# Patient Record
Sex: FEMALE | Race: BLACK OR AFRICAN AMERICAN | Employment: UNEMPLOYED | URBAN - METROPOLITAN AREA
[De-identification: names, ages, dates, MRNs, and addresses within clinical notes are randomized per-mention and may not be internally consistent; named-entity substitution may affect disease eponyms.]

---

## 2017-01-04 RX ORDER — LEVONORGESTREL AND ETHINYL ESTRADIOL 0.1-0.02MG
KIT ORAL
Qty: 28 TAB | Refills: 0 | Status: SHIPPED | OUTPATIENT
Start: 2017-01-04 | End: 2018-01-02 | Stop reason: ALTCHOICE

## 2018-01-02 ENCOUNTER — OFFICE VISIT (OUTPATIENT)
Dept: FAMILY MEDICINE CLINIC | Age: 23
End: 2018-01-02

## 2018-01-02 VITALS
BODY MASS INDEX: 23.18 KG/M2 | TEMPERATURE: 97.6 F | DIASTOLIC BLOOD PRESSURE: 80 MMHG | OXYGEN SATURATION: 100 % | HEART RATE: 88 BPM | WEIGHT: 171.13 LBS | HEIGHT: 72 IN | SYSTOLIC BLOOD PRESSURE: 100 MMHG | RESPIRATION RATE: 16 BRPM

## 2018-01-02 DIAGNOSIS — F32.A ANXIETY AND DEPRESSION: ICD-10-CM

## 2018-01-02 DIAGNOSIS — F41.9 ANXIETY AND DEPRESSION: ICD-10-CM

## 2018-01-02 DIAGNOSIS — N92.0 MENORRHAGIA WITH REGULAR CYCLE: Primary | ICD-10-CM

## 2018-01-02 RX ORDER — ETONOGESTREL AND ETHINYL ESTRADIOL 11.7; 2.7 MG/1; MG/1
1 INSERT, EXTENDED RELEASE VAGINAL
Qty: 3 DEVICE | Refills: 3 | Status: SHIPPED | OUTPATIENT
Start: 2018-01-02

## 2018-01-02 RX ORDER — ETONOGESTREL AND ETHINYL ESTRADIOL 11.7; 2.7 MG/1; MG/1
INSERT, EXTENDED RELEASE VAGINAL
COMMUNITY
End: 2018-01-02 | Stop reason: SDUPTHER

## 2018-01-02 RX ORDER — ESCITALOPRAM OXALATE 10 MG/1
10 TABLET ORAL DAILY
Qty: 30 TAB | Refills: 5 | Status: SHIPPED | OUTPATIENT
Start: 2018-01-02 | End: 2018-01-30 | Stop reason: ALTCHOICE

## 2018-01-02 NOTE — PROGRESS NOTES
1. Have you been to the ER, urgent care clinic since your last visit? Hospitalized since your last visit? No    2. Have you seen or consulted any other health care providers outside of the [address] [place] [address] since your last visit? Include any pap smears or colon screening. No    Chief Complaint   Patient presents with    Other     discuss BC     Pt states she got nuvaring from Health Dept x 6 mo ago.

## 2018-01-02 NOTE — MR AVS SNAPSHOT
Visit Information Date & Time Provider Department Dept. Phone Encounter #  
 1/2/2018 11:00 AM Kaylayani Cadetashia, YFN [facility] [phone] [id number] Upcoming Health Maintenance Date Due Hepatitis A Peds Age 1-18 (1 of 2 - Standard Series) 6/12/1998 DTaP/Tdap/Td series (1 - Tdap) 6/12/2004 HPV AGE 9Y-26Y (1 of 3 - Female 3 Dose Series) 6/12/2008 Influenza Age 5 to Adult 8/1/2017 Allergies as of 1/2/2018  Review Complete On: 1/2/2018 By: Minor Minor LPN Severity Noted Reaction Type Reactions Aspirin  10/08/2013    Hives Current Immunizations  Never Reviewed No immunizations on file. Not reviewed this visit You Were Diagnosed With   
  
 Codes Comments Menorrhagia with regular cycle    -  Primary ICD-10-CM: N92.0 ICD-9-CM: 626.2 Anxiety and depression     ICD-10-CM: F41.8 ICD-9-CM: 300.00, 311 Vitals BP Pulse Temp Resp Height(growth percentile) Weight(growth percentile) 100/80 88 97.6 °F (36.4 °C) (Oral) 16 6' (1.829 m) 171 lb 2 oz (77.6 kg) SpO2 BMI OB Status Smoking Status 100% 23.21 kg/m2 Chemically Induced Never Smoker Vitals History BMI and BSA Data Body Mass Index Body Surface Area  
 23.21 kg/m 2 1.99 m 2 Preferred Pharmacy Pharmacy Name Phone [facility] [address] [place] [address], [address] [place] [address] [phone] Your Updated Medication List  
  
   
This list is accurate as of: 1/2/18 11:19 AM.  Always use your most recent med list.  
  
  
  
  
 albuterol 90 mcg/actuation inhaler Commonly known as:  PROAIR HFA Take 1 Puff by inhalation every four (4) hours as needed for Wheezing or Shortness of Breath.  
  
 escitalopram oxalate 10 mg tablet Commonly known as:  Alexander Mandeville Take 1 Tab by mouth daily. ethinyl estradiol-etonogestrel 0.12-0.015 mg/24 hr vaginal ring Commonly known as:  Marco Chirinos  
 1 Each by Intravaginal route every twenty-eight (28) days. loratadine 10 mg tablet Commonly known as:  Jim Wall Take 1 Tab by mouth daily. Prescriptions Sent to Pharmacy Refills  
 ethinyl estradiol-etonogestrel (NUVARING) 0.12-0.015 mg/24 hr vaginal ring 3 Si Each by Intravaginal route every twenty-eight (28) days. Class: Normal  
 Pharmacy: [facility] [address] [place], [place] [address]y [address] [place] [address] E Ph #: [phone] Route: Intravaginal  
 escitalopram oxalate (LEXAPRO) 10 mg tablet 5 Sig: Take 1 Tab by mouth daily. Class: Normal  
 Pharmacy: [facility] [address] [place], [place] [address]y [address] [place] [address] E Ph #: [phone] Route: Oral  
  
Introducing [facility] & HEALTH SERVICES! Chelsey Short introduces Servhawk patient portal. Now you can access parts of your medical record, email your doctor's office, and request medication refills online. 1. In your internet browser, go to [email or web address] 2. Click on the First Time User? Click Here link in the Sign In box. You will see the New Member Sign Up page. 3. Enter your Servhawk Access Code exactly as it appears below. You will not need to use this code after youve completed the sign-up process. If you do not sign up before the expiration date, you must request a new code. · Servhawk Access Code: [email or web address] Expires: 2018 11:19 AM 
 
4. Enter the last four digits of your Social Security Number (xxxx) and Date of Birth (mm/dd/yyyy) as indicated and click Submit. You will be taken to the next sign-up page. 5. Create a [email or web address] ID. This will be your Servhawk login ID and cannot be changed, so think of one that is secure and easy to remember. 6. Create a Servhawk password. You can change your password at any time. 7. Enter your Password Reset Question and Answer.  This can be used at a later time if you forget your password. 8. Enter your e-mail address. You will receive e-mail notification when new information is available in [address] 9. Click Sign Up. You can now view and download portions of your medical record. 10. Click the Download Summary menu link to download a portable copy of your medical information. If you have questions, please visit the Frequently Asked Questions section of the [email or web address] website. Remember, [email or web address] is NOT to be used for urgent needs. For medical emergencies, dial 911. Now available from your iPhone and Android! Please provide this summary of care documentation to your next provider. Your primary care clinician is listed as Se Zafar. If you have any questions after today's visit, please call [phone].

## 2018-01-03 NOTE — PROGRESS NOTES
HISTORY OF PRESENT ILLNESS  Sandeep Gentile is a 23 y.o. female. HPI  She is here for refill of her nuvaring, first started by health dept  Without the ring she has very heavy painful cycles    Would also like to restart her med for anxiety and depression  zoloft last year but did not feel it helped    ROS  A comprehensive review of system was obtained and negative except findings in the HPI    Visit Vitals    /80    Pulse 88    Temp 97.6 °F (36.4 °C) (Oral)    Resp 16    Ht 6' (1.829 m)    Wt 171 lb 2 oz (77.6 kg)    SpO2 100%    BMI 23.21 kg/m2     Physical Exam   Constitutional: She is oriented to person, place, and time. She appears well-developed and well-nourished. Neck: No JVD present. Cardiovascular: Normal rate, regular rhythm and intact distal pulses. Exam reveals no gallop and no friction rub. No murmur heard. Pulmonary/Chest: Effort normal and breath sounds normal. No respiratory distress. She has no wheezes. Musculoskeletal: She exhibits no edema. Neurological: She is alert and oriented to person, place, and time. Skin: Skin is warm. Nursing note and vitals reviewed. ASSESSMENT and PLAN  Encounter Diagnoses   Name Primary?  Menorrhagia with regular cycle Yes    Anxiety and depression      Orders Placed This Encounter    ethinyl estradiol-etonogestrel (NUVARING) 0.12-0.015 mg/24 hr vaginal ring    escitalopram oxalate (LEXAPRO) 10 mg tablet     Refilled nuvaring, will set up pap for this summer after she turns 23  Start lexapro 10mg one a day  Reviewed adr/se of med and what to expect  Recheck 3 weeks    I have discussed the diagnosis with the patient and the intended plan as seen in the above orders. The patient has received an after-visit summary and questions were answered concerning future plans. Patient conveyed understanding of the plan at the time of the visit.     Abigail Ribeiro, MSN, ANP  1/2/2018

## 2018-01-12 ENCOUNTER — OFFICE VISIT (OUTPATIENT)
Dept: FAMILY MEDICINE CLINIC | Age: 23
End: 2018-01-12

## 2018-01-12 VITALS
SYSTOLIC BLOOD PRESSURE: 121 MMHG | RESPIRATION RATE: 18 BRPM | TEMPERATURE: 98.9 F | BODY MASS INDEX: 22.75 KG/M2 | HEIGHT: 72 IN | DIASTOLIC BLOOD PRESSURE: 80 MMHG | WEIGHT: 168 LBS | HEART RATE: 72 BPM

## 2018-01-12 DIAGNOSIS — R30.0 BURNING WITH URINATION: ICD-10-CM

## 2018-01-12 DIAGNOSIS — R35.0 URINARY FREQUENCY: ICD-10-CM

## 2018-01-12 DIAGNOSIS — N39.0 URINARY TRACT INFECTION WITHOUT HEMATURIA, SITE UNSPECIFIED: Primary | ICD-10-CM

## 2018-01-12 LAB
BILIRUB UR QL STRIP: NORMAL
GLUCOSE UR-MCNC: NEGATIVE MG/DL
KETONES P FAST UR STRIP-MCNC: NORMAL MG/DL
PH UR STRIP: 6.5 [PH] (ref 4.6–8)
PROT UR QL STRIP: NORMAL
SP GR UR STRIP: 1.02 (ref 1–1.03)
UA UROBILINOGEN AMB POC: NORMAL (ref 0.2–1)
URINALYSIS CLARITY POC: CLEAR
URINALYSIS COLOR POC: YELLOW
URINE BLOOD POC: NORMAL
URINE LEUKOCYTES POC: NORMAL
URINE NITRITES POC: NEGATIVE

## 2018-01-12 RX ORDER — SULFAMETHOXAZOLE AND TRIMETHOPRIM 800; 160 MG/1; MG/1
1 TABLET ORAL 2 TIMES DAILY
Qty: 10 TAB | Refills: 0 | Status: SHIPPED | OUTPATIENT
Start: 2018-01-12 | End: 2018-01-17

## 2018-01-12 RX ORDER — FLUCONAZOLE 150 MG/1
150 TABLET ORAL DAILY
Qty: 1 TAB | Refills: 1 | Status: SHIPPED | OUTPATIENT
Start: 2018-01-12 | End: 2018-01-13

## 2018-01-12 NOTE — PROGRESS NOTES
Chief Complaint   Patient presents with    Urinary Frequency    Urinary Burning     Patient in office today for possible uti sx that began 2 days ago. Have not treated with otc. 1. Have you been to the ER, urgent care clinic since your last visit? Hospitalized since your last visit? No    2. Have you seen or consulted any other health care providers outside of the [address] [place] [address] since your last visit? Include any pap smears or colon screening.  No

## 2018-01-12 NOTE — PROGRESS NOTES
Chief Complaint   Patient presents with    Urinary Frequency    Urinary Burning     Patient in office today for possible uti sx that began 2 days ago. Have not treated with otc. Pt reports burning with urination. Denies any vaginal discharge. Currently on OCP. Denies any recent abx. Denies any fever, flank or kidney pain. Denies any other concerns at this time. Chief Complaint   Patient presents with    Urinary Frequency    Urinary Burning     she is a 23y.o. year old female who presents for evalution. Reviewed PmHx, RxHx, FmHx, SocHx, AllgHx and updated and dated in the chart. Review of Systems - negative except as listed above in the HPI    Objective:     Vitals:    01/12/18 0750   BP: 121/80   Pulse: 72   Resp: 18   Temp: 98.9 °F (37.2 °C)   TempSrc: Oral   Weight: 168 lb (76.2 kg)   Height: 6' (1.829 m)     Physical Examination: General appearance - alert, well appearing, and in no distress  Chest - clear to auscultation, no wheezes, rales or rhonchi, symmetric air entry  Heart - normal rate, regular rhythm, normal S1, S2, no murmurs  Abdomen - no CVA tenderness    Assessment/ Plan:   Diagnoses and all orders for this visit:    1. Urinary tract infection without hematuria, site unspecified / 2. Urinary frequency / 3. Burning with urination  -     CULTURE, URINE  -     trimethoprim-sulfamethoxazole (BACTRIM DS, SEPTRA DS) 160-800 mg per tablet; Take 1 Tab by mouth two (2) times a day for 5 days.  -     AMB POC URINALYSIS DIP STICK AUTO W/O MICRO  Start and complete full course of bactrim. Reviewed SEs/ADRs of medication. Push fluids. PRN azo or urostat for dysuria but for no more than 3 days. Will notify results of culture and deviate plan based on findings. Follow up if sx persist or worsen to retest urine. '  Other orders  -     fluconazole (DIFLUCAN) 150 mg tablet; Take 1 Tab by mouth daily for 1 day. Take once abx complete if needed for yeast infection.      Follow-up Disposition:  Return if symptoms worsen or fail to improve. I have discussed the diagnosis with the patient and the intended plan as seen in the above orders. The patient has received an after-visit summary and questions were answered concerning future plans. Medication Side Effects and Warnings were discussed with patient: yes  Patient Labs were reviewed and or requested: yes  Patient Past Records were reviewed and or requested  yes  Patient / Caregiver Understanding of treatment plan was verbalized during office visit YES    KHUSHI Ragsdale    There are no Patient Instructions on file for this visit.

## 2018-01-12 NOTE — MR AVS SNAPSHOT
Visit Information Date & Time Provider Department Dept. Phone Encounter #  
 1/12/2018  7:45 AM Sidney Pardo NP [facility] [phone] [id number] Follow-up Instructions Return if symptoms worsen or fail to improve. Upcoming Health Maintenance Date Due Hepatitis A Peds Age 1-18 (1 of 2 - Standard Series) 6/12/1998 DTaP/Tdap/Td series (1 - Tdap) 6/12/2004 HPV AGE 9Y-26Y (1 of 3 - Female 3 Dose Series) 6/12/2008 Influenza Age 5 to Adult 8/1/2017 Allergies as of 1/12/2018  Review Complete On: 1/12/2018 By: Sidney Pardo NP Severity Noted Reaction Type Reactions Aspirin  10/08/2013    Hives Current Immunizations  Never Reviewed No immunizations on file. Not reviewed this visit You Were Diagnosed With   
  
 Codes Comments Urinary tract infection without hematuria, site unspecified    -  Primary ICD-10-CM: N39.0 ICD-9-CM: 599.0 Urinary frequency     ICD-10-CM: R35.0 ICD-9-CM: 788.41 Burning with urination     ICD-10-CM: R30.0 ICD-9-CM: 217. 1 Vitals BP Pulse Temp Resp Height(growth percentile) Weight(growth percentile) 121/80 (BP 1 Location: Right arm, BP Patient Position: Sitting) 72 98.9 °F (37.2 °C) (Oral) 18 6' (1.829 m) 168 lb (76.2 kg) LMP BMI OB Status Smoking Status 12/31/2017 22.78 kg/m2 Chemically Induced Never Smoker Vitals History BMI and BSA Data Body Mass Index Body Surface Area 22.78 kg/m 2 1.97 m 2 Preferred Pharmacy Pharmacy Name Phone [facility] [address] [place] [address], [address] [place] [address] [phone] Your Updated Medication List  
  
   
This list is accurate as of: 1/12/18  8:11 AM.  Always use your most recent med list.  
  
  
  
  
 albuterol 90 mcg/actuation inhaler Commonly known as:  PROAIR HFA Take 1 Puff by inhalation every four (4) hours as needed for Wheezing or Shortness of Breath.  
  
 escitalopram oxalate 10 mg tablet Commonly known as:  Clevester Clap Take 1 Tab by mouth daily. ethinyl estradiol-etonogestrel 0.12-0.015 mg/24 hr vaginal ring Commonly known as:  NUVARING  
1 Each by Intravaginal route every twenty-eight (28) days. fluconazole 150 mg tablet Commonly known as:  DIFLUCAN Take 1 Tab by mouth daily for 1 day. loratadine 10 mg tablet Commonly known as:  Miller Davis Take 1 Tab by mouth daily. trimethoprim-sulfamethoxazole 160-800 mg per tablet Commonly known as:  BACTRIM DS, SEPTRA DS Take 1 Tab by mouth two (2) times a day for 5 days. Prescriptions Sent to Pharmacy Refills  
 trimethoprim-sulfamethoxazole (BACTRIM DS, SEPTRA DS) 160-800 mg per tablet 0 Sig: Take 1 Tab by mouth two (2) times a day for 5 days. Class: Normal  
 Pharmacy: [facility] [address] [place], [place] [address]y [address] [place] [address] E Ph #: [phone] Route: Oral  
 fluconazole (DIFLUCAN) 150 mg tablet 1 Sig: Take 1 Tab by mouth daily for 1 day. Class: Normal  
 Pharmacy: [facility] [address] [place], [place] [address]y [address] [place] [address] E Ph #: [phone] Route: Oral  
  
We Performed the Following AMB POC URINALYSIS DIP STICK AUTO W/O MICRO [25022 CPT(R)] CULTURE, URINE [id number] CPT(R)] Follow-up Instructions Return if symptoms worsen or fail to improve. Introducing hospitals & HEALTH SERVICES! Jayelne Deleon introduces Real [employer] Content patient portal. Now you can access parts of your medical record, email your doctor's office, and request medication refills online. 1. In your internet browser, go to [email or web address] 2. Click on the First Time User? Click Here link in the Sign In box. You will see the New Member Sign Up page. 3. Enter your Real Time Content Access Code exactly as it appears below.  You will not need to use this code after youve completed the sign-up process. If you do not sign up before the expiration date, you must request a new code. · Zenverge Access Code: [email or web address] Expires: 4/2/2018 11:19 AM 
 
4. Enter the last four digits of your Social Security Number (xxxx) and Date of Birth (mm/dd/yyyy) as indicated and click Submit. You will be taken to the next sign-up page. 5. Create a Zenverge ID. This will be your Zenverge login ID and cannot be changed, so think of one that is secure and easy to remember. 6. Create a Zenverge password. You can change your password at any time. 7. Enter your Password Reset Question and Answer. This can be used at a later time if you forget your password. 8. Enter your e-mail address. You will receive e-mail notification when new information is available in [address] 9. Click Sign Up. You can now view and download portions of your medical record. 10. Click the Download Summary menu link to download a portable copy of your medical information. If you have questions, please visit the Frequently Asked Questions section of the Zenverge website. Remember, Zenverge is NOT to be used for urgent needs. For medical emergencies, dial 911. Now available from your iPhone and Android! Please provide this summary of care documentation to your next provider. Your primary care clinician is listed as Marta Posadas. If you have any questions after today's visit, please call [phone].

## 2018-01-13 LAB — BACTERIA UR CULT: NO GROWTH

## 2018-01-15 NOTE — PROGRESS NOTES
Please notify pt that her urine culture shows she does not have a UTI. Can discontinue abx that was prescribed during OV. Follow up if sx persist or worsen.

## 2018-01-16 ENCOUNTER — OFFICE VISIT (OUTPATIENT)
Dept: FAMILY MEDICINE CLINIC | Age: 23
End: 2018-01-16

## 2018-01-16 VITALS
OXYGEN SATURATION: 98 % | HEIGHT: 72 IN | WEIGHT: 168 LBS | BODY MASS INDEX: 22.75 KG/M2 | RESPIRATION RATE: 18 BRPM | DIASTOLIC BLOOD PRESSURE: 83 MMHG | SYSTOLIC BLOOD PRESSURE: 125 MMHG | TEMPERATURE: 98.1 F | HEART RATE: 73 BPM

## 2018-01-16 DIAGNOSIS — R30.0 BURNING WITH URINATION: ICD-10-CM

## 2018-01-16 DIAGNOSIS — R35.0 URINARY FREQUENCY: ICD-10-CM

## 2018-01-16 DIAGNOSIS — B37.31 YEAST VAGINITIS: Primary | ICD-10-CM

## 2018-01-16 RX ORDER — FLUCONAZOLE 150 MG/1
150 TABLET ORAL DAILY
Qty: 2 TAB | Refills: 0 | Status: SHIPPED | OUTPATIENT
Start: 2018-01-16 | End: 2018-01-17

## 2018-01-16 NOTE — PROGRESS NOTES
Chief Complaint   Patient presents with    Urinary Frequency    Urinary Burning     Patient in office today for urinary sx that have not improved since lov. Pt urine culture was not positive for bacteria growth, abx was stopped. Pt states while on abx did not notice an improvement on abx. Pt is sexually active with same person for the last 2 years. Not using protection consistently. Taking OCP daily denies any chance she could be pregnant. Has some vaginal discharge present. Yellow. Denies any itchiness. Has noticed increased burning with urination. Noticed some blood tinge to the discharge. Denies any other concerns     Chief Complaint   Patient presents with    Urinary Frequency    Urinary Burning     she is a 23y.o. year old female who presents for evalution. Reviewed PmHx, RxHx, FmHx, SocHx, AllgHx and updated and dated in the chart. Review of Systems - negative except as listed above in the HPI    Objective:     Vitals:    01/16/18 0937   BP: 125/83   Pulse: 73   Resp: 18   Temp: 98.1 °F (36.7 °C)   TempSrc: Oral   SpO2: 98%   Weight: 168 lb (76.2 kg)   Height: 6' (1.829 m)     Physical Examination: General appearance - alert, well appearing, and in no distress  Chest - clear to auscultation, no wheezes, rales or rhonchi, symmetric air entry  Heart - normal rate, regular rhythm, normal S1, S2, no murmurs  Abdomen - soft, nontender  Pelvic exam: VULVA: normal appearing vulva with no masses, tenderness or lesions, VAGINA: normal appearing vagina with normal color and discharge, no lesions. Assessment/ Plan:   Diagnoses and all orders for this visit:    1. Yeast vaginitis  -     fluconazole (DIFLUCAN) 150 mg tablet; Take 1 Tab by mouth daily for 1 day. Repeat dose in 3 days. Complete as directed. 2. Urinary frequency / 3. Burning with urination  -     NUSWAB VAGINITIS PLUS  Will notify results and deviate plan based on findings.        Follow-up Disposition:  Return if symptoms worsen or fail to improve. I have discussed the diagnosis with the patient and the intended plan as seen in the above orders. The patient has received an after-visit summary and questions were answered concerning future plans. Medication Side Effects and Warnings were discussed with patient: yes  Patient Labs were reviewed and or requested: yes  Patient Past Records were reviewed and or requested  yes  Patient / Caregiver Understanding of treatment plan was verbalized during office visit YES    KHUSHI Marie    There are no Patient Instructions on file for this visit.

## 2018-01-19 DIAGNOSIS — N76.0 BV (BACTERIAL VAGINOSIS): Primary | ICD-10-CM

## 2018-01-19 DIAGNOSIS — A74.9 CHLAMYDIA INFECTION: ICD-10-CM

## 2018-01-19 DIAGNOSIS — B96.89 BV (BACTERIAL VAGINOSIS): Primary | ICD-10-CM

## 2018-01-19 LAB
A VAGINAE DNA VAG QL NAA+PROBE: ABNORMAL SCORE
BVAB2 DNA VAG QL NAA+PROBE: ABNORMAL SCORE
C ALBICANS DNA VAG QL NAA+PROBE: POSITIVE
C GLABRATA DNA VAG QL NAA+PROBE: NEGATIVE
C TRACH RRNA SPEC QL NAA+PROBE: POSITIVE
MEGA1 DNA VAG QL NAA+PROBE: ABNORMAL SCORE
N GONORRHOEA RRNA SPEC QL NAA+PROBE: NEGATIVE
T VAGINALIS RRNA SPEC QL NAA+PROBE: NEGATIVE

## 2018-01-19 RX ORDER — AZITHROMYCIN 500 MG/1
1000 TABLET, FILM COATED ORAL
Qty: 2 TAB | Refills: 0 | Status: SHIPPED | OUTPATIENT
Start: 2018-01-19 | End: 2018-01-19

## 2018-01-19 RX ORDER — METRONIDAZOLE 500 MG/1
500 TABLET ORAL 2 TIMES DAILY
Qty: 14 TAB | Refills: 0 | Status: SHIPPED | OUTPATIENT
Start: 2018-01-19 | End: 2018-01-26

## 2018-01-19 NOTE — PROGRESS NOTES
Please notify pt the followin. Vaginal swab is positive for chlamydia. This is a STI so she will need to notify partner to be tested and treated. Rx for single dose of zithromax sent to pharmacy on file for pt to complete as directed. 2. Swab also positive for BV. Treatment for this is flagyl BID for 7 days. Avoid ETOH while on medication. 3. Yeast infection present should have resolved with completing diflucan prescribed during OV. Complete medications as directed. Follow up in 1 month to repeat nuswab.

## 2018-01-30 ENCOUNTER — OFFICE VISIT (OUTPATIENT)
Dept: FAMILY MEDICINE CLINIC | Age: 23
End: 2018-01-30

## 2018-01-30 VITALS
HEART RATE: 82 BPM | DIASTOLIC BLOOD PRESSURE: 70 MMHG | SYSTOLIC BLOOD PRESSURE: 123 MMHG | WEIGHT: 168 LBS | BODY MASS INDEX: 22.75 KG/M2 | RESPIRATION RATE: 18 BRPM | TEMPERATURE: 98.5 F | HEIGHT: 72 IN

## 2018-01-30 DIAGNOSIS — F41.9 ANXIETY AND DEPRESSION: ICD-10-CM

## 2018-01-30 DIAGNOSIS — N89.8 VAGINAL DISCHARGE: Primary | ICD-10-CM

## 2018-01-30 DIAGNOSIS — F32.A ANXIETY AND DEPRESSION: ICD-10-CM

## 2018-01-30 RX ORDER — VENLAFAXINE HYDROCHLORIDE 37.5 MG/1
37.5 CAPSULE, EXTENDED RELEASE ORAL DAILY
Qty: 7 CAP | Refills: 0 | Status: SHIPPED | OUTPATIENT
Start: 2018-01-30

## 2018-01-30 RX ORDER — VENLAFAXINE HYDROCHLORIDE 75 MG/1
75 CAPSULE, EXTENDED RELEASE ORAL DAILY
Qty: 30 CAP | Refills: 0 | Status: SHIPPED | OUTPATIENT
Start: 2018-01-30

## 2018-01-30 NOTE — PROGRESS NOTES
Chief Complaint   Patient presents with   [facility] Follow-up     Patient in office today for f/u on labs and to have nuswab repeated. Pt has completed all abx therapy for BV, Chlamydia, and yeast infection. Denies any persistent sx. Pt is no longer with the same person, has not been sexually active since last OV. Did not tolerate leaxpro due to nausea. Not currently taking it due to SEs. Tried taking 1/2 a tab which didn't seem to help. Has not tried effexor previously. Denies any other concerns at this time. Chief Complaint   Patient presents with   [facility] Follow-up     she is a 23y.o. year old female who presents for evalution. Reviewed PmHx, RxHx, FmHx, SocHx, AllgHx and updated and dated in the chart. Review of Systems - negative except as listed above in the HPI    Objective:     Vitals:    01/30/18 0748   BP: 123/70   Pulse: 82   Resp: 18   Temp: 98.5 °F (36.9 °C)   TempSrc: Oral   Weight: 168 lb (76.2 kg)   Height: 6' (1.829 m)     Physical Examination: General appearance - alert, well appearing, and in no distress  Mental status - normal mood, behavior  Chest - clear to auscultation, no wheezes, rales or rhonchi, symmetric air entry  Heart - normal rate, regular rhythm, normal S1, S2, no murmurs, rubs, clicks or gallops    Assessment/ Plan:   Diagnoses and all orders for this visit:    1. Vaginal discharge  -     NUSWAB VAGINITIS PLUS  Rechecking after completing therapy for BV and chlamydia. Will notify results and deviate plan based on findings. 2. Anxiety and depression  -     venlafaxine-SR (EFFEXOR-XR) 37.5 mg capsule; Take 1 Cap by mouth daily. Starting dose days 1-7.  -     venlafaxine-SR (EFFEXOR-XR) 75 mg capsule; Take 1 Cap by mouth daily. Maintenance dose. Start effexor daily. Reviewed SEs/ADRs of medication. Enc pt to follow up if sx persist or worsen or if medication SEs intolerable.       Follow-up Disposition:  Return if symptoms worsen or fail to improve. I have discussed the diagnosis with the patient and the intended plan as seen in the above orders. The patient has received an after-visit summary and questions were answered concerning future plans. Medication Side Effects and Warnings were discussed with patient: yes  Patient Labs were reviewed and or requested: yes  Patient Past Records were reviewed and or requested  yes  Patient / Caregiver Understanding of treatment plan was verbalized during office visit YES    Fatuma Greenfield, PROMISE-C    Patient Instructions   Venlafaxine (By mouth)   Venlafaxine ([id number])  Treats depression, generalized anxiety disorder, panic disorder, and social anxiety disorder. Brand Name(s): Effexor XR   There may be other brand names for this medicine. When This Medicine Should Not Be Used: This medicine is not right for everyone. Do not use it if you had an allergic reaction to venlafaxine or desvenlafaxine succinate. How to Use This Medicine:   Long Acting Capsule, Tablet, Long Acting Tablet  · Take your medicine as directed. Your dose may need to be changed several times to find what works best for you. · It is best to take the extended-release capsule at the same time each day (either in the morning or evening). · It is best to take this medicine with food or milk. · Swallow the extended-release capsule whole. Do not crush, break, or chew it. Do not place the capsule in a liquid. · If you cannot swallow the extended-release capsule, you may open it and pour the medicine into a small amount of soft food such as pudding, yogurt, or applesauce. Stir this mixture well and swallow it without chewing. · This medicine should come with a Medication Guide. Ask your pharmacist for a copy if you do not have one. · Missed dose: Take a dose as soon as you remember. If it is almost time for your next dose, wait until then and take a regular dose. Do not take extra medicine to make up for a missed dose.   · Store the medicine in a closed container at room temperature, away from heat, moisture, and direct light. Drugs and Foods to Avoid:   Ask your doctor or pharmacist before using any other medicine, including over-the-counter medicines, vitamins, and herbal products. · Do not use this medicine if you have used an MAO inhibitor within the past 14 days. Do not take an MAO inhibitor for at least 7 days after you stop this medicine. · Some medicines can affect how venlafaxine works. Tell your doctor if you are using any of the following:   ¨ Buspirone, cimetidine, fentanyl, ketoconazole, lithium, metoprolol, mirtazapine, Rachel's wort, tramadol, or tryptophan supplements  ¨ Amphetamines  ¨ Blood thinner (including warfarin)  ¨ Diuretic (water pill)  ¨ Medicine for migraine headaches  ¨ Medicine to lose weight (including phentermine)  ¨ NSAID pain or arthritis medicine (including aspirin, celecoxib, diclofenac, ibuprofen, naproxen)  ¨ Tricyclic antidepressant  · Do not drink alcohol while you are using this medicine. · Tell your doctor if you use anything else that makes you sleepy. Some examples are allergy medicine, narcotic pain medicine, and alcohol. Warnings While Using This Medicine:   · Tell your doctor if you are pregnant or breastfeeding, or if you have kidney disease, liver disease, glaucoma, heart disease, high blood pressure, or thyroid problems. Tell your doctor if you have a history of frantz, seizures, heart attack, or stroke. · This medicine can increase thoughts of suicide. Tell your doctor right away if you start to feel depressed and have thoughts about hurting yourself. · This medicine may cause the following problems:   ¨ Serotonin syndrome (when used with certain medicines)  ¨ Increased cholesterol levels  ¨ Increased blood pressure  ¨ Increased risk of bleeding problems  ¨ Low sodium levels  ¨ Interstitial lung disease and eosinophilic pneumonia  · This medicine may make you dizzy or drowsy.  Do not drive or do anything that could be dangerous until you know how this medicine affects you. · Do not stop using this medicine suddenly. Your doctor will need to slowly decrease your dose before you stop it completely. · Tell any doctor or dentist who treats you that you are using this medicine. This medicine may affect certain medical test results. · Your doctor will do lab tests at regular visits to check on the effects of this medicine. Keep all appointments. · Keep all medicine out of the reach of children. Never share your medicine with anyone. Possible Side Effects While Using This Medicine:   Call your doctor right away if you notice any of these side effects:  · Allergic reaction: Itching or hives, swelling in your face or hands, swelling or tingling in your mouth or throat, chest tightness, trouble breathing  · Anxiety, restlessness, fever, sweating, muscle spasms, nausea, vomiting, diarrhea, seeing or hearing things that are not there  · Blistering, peeling, red skin rash  · Chest pain, cough, trouble breathing  · Confusion, weakness, and muscle twitching  · Eye pain, vision changes, seeing halos around lights  · Fast or pounding heartbeat  · Feeling more excited or energetic than usual  · Headache, trouble concentrating, memory problems, unsteadiness  · Seizures  · Unusual behavior, thoughts of hurting yourself or others, trouble sleeping, nervousness, unusual dreams  · Unusual bleeding or bruising  If you notice these less serious side effects, talk with your doctor:   · Dry mouth  · Mild nausea, constipation, vomiting, loss of appetite, weight loss  · Sexual problems  · Sleepiness or unusual drowsiness, dizziness  If you notice other side effects that you think are caused by this medicine, tell your doctor. Call your doctor for medical advice about side effects.  You may report side effects to FDA at 8-242-FDA-0076  © 2017 [address] [employer] Information is for End User's use only and may not be sold, redistributed or otherwise used for commercial purposes. The above information is an  only. It is not intended as medical advice for individual conditions or treatments. Talk to your doctor, nurse or pharmacist before following any medical regimen to see if it is safe and effective for you.

## 2018-01-30 NOTE — PATIENT INSTRUCTIONS
Venlafaxine (By mouth)   Venlafaxine ([id number])  Treats depression, generalized anxiety disorder, panic disorder, and social anxiety disorder. Brand Name(s): Effexor XR   There may be other brand names for this medicine. When This Medicine Should Not Be Used: This medicine is not right for everyone. Do not use it if you had an allergic reaction to venlafaxine or desvenlafaxine succinate. How to Use This Medicine:   Long Acting Capsule, Tablet, Long Acting Tablet  · Take your medicine as directed. Your dose may need to be changed several times to find what works best for you. · It is best to take the extended-release capsule at the same time each day (either in the morning or evening). · It is best to take this medicine with food or milk. · Swallow the extended-release capsule whole. Do not crush, break, or chew it. Do not place the capsule in a liquid. · If you cannot swallow the extended-release capsule, you may open it and pour the medicine into a small amount of soft food such as pudding, yogurt, or applesauce. Stir this mixture well and swallow it without chewing. · This medicine should come with a Medication Guide. Ask your pharmacist for a copy if you do not have one. · Missed dose: Take a dose as soon as you remember. If it is almost time for your next dose, wait until then and take a regular dose. Do not take extra medicine to make up for a missed dose. · Store the medicine in a closed container at room temperature, away from heat, moisture, and direct light. Drugs and Foods to Avoid:   Ask your doctor or pharmacist before using any other medicine, including over-the-counter medicines, vitamins, and herbal products. · Do not use this medicine if you have used an MAO inhibitor within the past 14 days. Do not take an MAO inhibitor for at least 7 days after you stop this medicine. · Some medicines can affect how venlafaxine works.  Tell your doctor if you are using any of the following: ¨ Buspirone, cimetidine, fentanyl, ketoconazole, lithium, metoprolol, mirtazapine, Rachel's wort, tramadol, or tryptophan supplements  ¨ Amphetamines  ¨ Blood thinner (including warfarin)  ¨ Diuretic (water pill)  ¨ Medicine for migraine headaches  ¨ Medicine to lose weight (including phentermine)  ¨ NSAID pain or arthritis medicine (including aspirin, celecoxib, diclofenac, ibuprofen, naproxen)  ¨ Tricyclic antidepressant  · Do not drink alcohol while you are using this medicine. · Tell your doctor if you use anything else that makes you sleepy. Some examples are allergy medicine, narcotic pain medicine, and alcohol. Warnings While Using This Medicine:   · Tell your doctor if you are pregnant or breastfeeding, or if you have kidney disease, liver disease, glaucoma, heart disease, high blood pressure, or thyroid problems. Tell your doctor if you have a history of frantz, seizures, heart attack, or stroke. · This medicine can increase thoughts of suicide. Tell your doctor right away if you start to feel depressed and have thoughts about hurting yourself. · This medicine may cause the following problems:   ¨ Serotonin syndrome (when used with certain medicines)  ¨ Increased cholesterol levels  ¨ Increased blood pressure  ¨ Increased risk of bleeding problems  ¨ Low sodium levels  ¨ Interstitial lung disease and eosinophilic pneumonia  · This medicine may make you dizzy or drowsy. Do not drive or do anything that could be dangerous until you know how this medicine affects you. · Do not stop using this medicine suddenly. Your doctor will need to slowly decrease your dose before you stop it completely. · Tell any doctor or dentist who treats you that you are using this medicine. This medicine may affect certain medical test results. · Your doctor will do lab tests at regular visits to check on the effects of this medicine. Keep all appointments. · Keep all medicine out of the reach of children.  Never share your medicine with anyone. Possible Side Effects While Using This Medicine:   Call your doctor right away if you notice any of these side effects:  · Allergic reaction: Itching or hives, swelling in your face or hands, swelling or tingling in your mouth or throat, chest tightness, trouble breathing  · Anxiety, restlessness, fever, sweating, muscle spasms, nausea, vomiting, diarrhea, seeing or hearing things that are not there  · Blistering, peeling, red skin rash  · Chest pain, cough, trouble breathing  · Confusion, weakness, and muscle twitching  · Eye pain, vision changes, seeing halos around lights  · Fast or pounding heartbeat  · Feeling more excited or energetic than usual  · Headache, trouble concentrating, memory problems, unsteadiness  · Seizures  · Unusual behavior, thoughts of hurting yourself or others, trouble sleeping, nervousness, unusual dreams  · Unusual bleeding or bruising  If you notice these less serious side effects, talk with your doctor:   · Dry mouth  · Mild nausea, constipation, vomiting, loss of appetite, weight loss  · Sexual problems  · Sleepiness or unusual drowsiness, dizziness  If you notice other side effects that you think are caused by this medicine, tell your doctor. Call your doctor for medical advice about side effects. You may report side effects to FDA at 4-955-FDA-5663  © 2017 [facility] Information is for End User's use only and may not be sold, redistributed or otherwise used for commercial purposes. The above information is an  only. It is not intended as medical advice for individual conditions or treatments. Talk to your doctor, nurse or pharmacist before following any medical regimen to see if it is safe and effective for you.

## 2018-01-30 NOTE — MR AVS SNAPSHOT
[address] [address] 
[phone] Patient: Jeevan Hartley MRN: [id number] :1995 Visit Information Date & Time Provider Department Dept. Phone Encounter #  
 2018  7:30 AM Josephine Ayoub NP [facility] [phone] [id number] Follow-up Instructions Return if symptoms worsen or fail to improve. Upcoming Health Maintenance Date Due Hepatitis A Peds Age 1-18 (1 of 2 - Standard Series) 1998 DTaP/Tdap/Td series (1 - Tdap) 2004 HPV AGE 9Y-26Y (1 of 3 - Female 3 Dose Series) 2008 Influenza Age 5 to Adult 2017 Allergies as of 2018  Review Complete On: 2018 By: Josephine Ayoub NP Severity Noted Reaction Type Reactions Aspirin  10/08/2013    Hives Current Immunizations  Never Reviewed No immunizations on file. Not reviewed this visit You Were Diagnosed With   
  
 Codes Comments Vaginal discharge    -  Primary ICD-10-CM: N89.8 ICD-9-CM: 623.5 Anxiety and depression     ICD-10-CM: F41.8 ICD-9-CM: 300.00, 311 Vitals BP Pulse Temp Resp Height(growth percentile) Weight(growth percentile) 123/70 (BP 1 Location: Right arm, BP Patient Position: Sitting) 82 98.5 °F (36.9 °C) (Oral) 18 6' (1.829 m) 168 lb (76.2 kg) LMP BMI OB Status Smoking Status 2017 22.78 kg/m2 Chemically Induced Never Smoker Vitals History BMI and BSA Data Body Mass Index Body Surface Area 22.78 kg/m 2 1.97 m 2 Preferred Pharmacy Pharmacy Name Phone [facility] [address] [facility], [address] [place] [address] [phone] Your Updated Medication List  
  
   
This list is accurate as of: 18  8:00 AM.  Always use your most recent med list.  
  
  
  
  
 albuterol 90 mcg/actuation inhaler Commonly known as:  PROAIR HFA  
 Take 1 Puff by inhalation every four (4) hours as needed for Wheezing or Shortness of Breath.  
  
 ethinyl estradiol-etonogestrel 0.12-0.015 mg/24 hr vaginal ring Commonly known as:  NUVARING  
1 Each by Intravaginal route every twenty-eight (28) days. loratadine 10 mg tablet Commonly known as:  Juliana Cuba Take 1 Tab by mouth daily. * venlafaxine-SR 37.5 mg capsule Commonly known as:  EFFEXOR-XR Take 1 Cap by mouth daily. Starting dose days 1-7. * venlafaxine-SR 75 mg capsule Commonly known as:  EFFEXOR-XR Take 1 Cap by mouth daily. Maintenance dose. * Notice: This list has 2 medication(s) that are the same as other medications prescribed for you. Read the directions carefully, and ask your doctor or other care provider to review them with you. Prescriptions Sent to Pharmacy Refills  
 venlafaxine-SR (EFFEXOR-XR) 37.5 mg capsule 0 Sig: Take 1 Cap by mouth daily. Starting dose days 1-7. Class: Normal  
 Pharmacy: [facility] [place] [address] E Ph #: [phone] Route: Oral  
 venlafaxine-SR (EFFEXOR-XR) 75 mg capsule 0 Sig: Take 1 Cap by mouth daily. Maintenance dose. Class: Normal  
 Pharmacy: [facility] [place] [address] E Ph #: [phone] Route: Oral  
  
We Performed the Following [address] [id number] Custom] Follow-up Instructions Return if symptoms worsen or fail to improve. Patient Instructions Venlafaxine (By mouth) Venlafaxine (zga-uq-FEA-een) Treats depression, generalized anxiety disorder, panic disorder, and social anxiety disorder. Brand Name(s): Effexor XR There may be other brand names for this medicine. When This Medicine Should Not Be Used: This medicine is not right for everyone.  Do not use it if you had an allergic reaction to venlafaxine or desvenlafaxine succinate. How to Use This Medicine:  
Long Acting Capsule, Tablet, Long Acting Tablet · Take your medicine as directed. Your dose may need to be changed several times to find what works best for you. · It is best to take the extended-release capsule at the same time each day (either in the morning or evening). · It is best to take this medicine with food or milk. · Swallow the extended-release capsule whole. Do not crush, break, or chew it. Do not place the capsule in a liquid. · If you cannot swallow the extended-release capsule, you may open it and pour the medicine into a small amount of soft food such as pudding, yogurt, or applesauce. Stir this mixture well and swallow it without chewing. · This medicine should come with a Medication Guide. Ask your pharmacist for a copy if you do not have one. · Missed dose: Take a dose as soon as you remember. If it is almost time for your next dose, wait until then and take a regular dose. Do not take extra medicine to make up for a missed dose. · Store the medicine in a closed container at room temperature, away from heat, moisture, and direct light. Drugs and Foods to Avoid: Ask your doctor or pharmacist before using any other medicine, including over-the-counter medicines, vitamins, and herbal products. · Do not use this medicine if you have used an MAO inhibitor within the past 14 days. Do not take an MAO inhibitor for at least 7 days after you stop this medicine. · Some medicines can affect how venlafaxine works. Tell your doctor if you are using any of the following:  
¨ Buspirone, cimetidine, fentanyl, ketoconazole, lithium, metoprolol, mirtazapine, Rachel's wort, tramadol, or tryptophan supplements ¨ Amphetamines ¨ Blood thinner (including warfarin) ¨ Diuretic (water pill) ¨ Medicine for migraine headaches ¨ Medicine to lose weight (including phentermine) ¨ NSAID pain or arthritis medicine (including aspirin, celecoxib, diclofenac, ibuprofen, naproxen) ¨ Tricyclic antidepressant · Do not drink alcohol while you are using this medicine. · Tell your doctor if you use anything else that makes you sleepy. Some examples are allergy medicine, narcotic pain medicine, and alcohol. Warnings While Using This Medicine: · Tell your doctor if you are pregnant or breastfeeding, or if you have kidney disease, liver disease, glaucoma, heart disease, high blood pressure, or thyroid problems. Tell your doctor if you have a history of frantz, seizures, heart attack, or stroke. · This medicine can increase thoughts of suicide. Tell your doctor right away if you start to feel depressed and have thoughts about hurting yourself. · This medicine may cause the following problems:  
¨ Serotonin syndrome (when used with certain medicines) ¨ Increased cholesterol levels ¨ Increased blood pressure ¨ Increased risk of bleeding problems ¨ Low sodium levels ¨ Interstitial lung disease and eosinophilic pneumonia · This medicine may make you dizzy or drowsy. Do not drive or do anything that could be dangerous until you know how this medicine affects you. · Do not stop using this medicine suddenly. Your doctor will need to slowly decrease your dose before you stop it completely. · Tell any doctor or dentist who treats you that you are using this medicine. This medicine may affect certain medical test results. · Your doctor will do lab tests at regular visits to check on the effects of this medicine. Keep all appointments. · Keep all medicine out of the reach of children. Never share your medicine with anyone. Possible Side Effects While Using This Medicine:  
Call your doctor right away if you notice any of these side effects: · Allergic reaction: Itching or hives, swelling in your face or hands, swelling or tingling in your mouth or throat, chest tightness, trouble breathing · Anxiety, restlessness, fever, sweating, muscle spasms, nausea, vomiting, diarrhea, seeing or hearing things that are not there · Blistering, peeling, red skin rash · Chest pain, cough, trouble breathing · Confusion, weakness, and muscle twitching · Eye pain, vision changes, seeing halos around lights · Fast or pounding heartbeat · Feeling more excited or energetic than usual 
· Headache, trouble concentrating, memory problems, unsteadiness · Seizures · Unusual behavior, thoughts of hurting yourself or others, trouble sleeping, nervousness, unusual dreams · Unusual bleeding or bruising If you notice these less serious side effects, talk with your doctor: · Dry mouth · Mild nausea, constipation, vomiting, loss of appetite, weight loss · Sexual problems · Sleepiness or unusual drowsiness, dizziness If you notice other side effects that you think are caused by this medicine, tell your doctor. Call your doctor for medical advice about side effects. You may report side effects to FDA at 5-510-FDA-9452 © 2017 [address]  Information is for End User's use only and may not be sold, redistributed or otherwise used for commercial purposes. The above information is an  only. It is not intended as medical advice for individual conditions or treatments. Talk to your doctor, nurse or pharmacist before following any medical regimen to see if it is safe and effective for you. Introducing [facility]! Kenneth Roldan introduces [employer]ark patient portal. Now you can access parts of your medical record, email your doctor's office, and request medication refills online. 1. In your internet browser, go to [email or web address] 2. Click on the First Time User? Click Here link in the Sign In box. You will see the New Member Sign Up page. 3. Enter your [employer]ark Access Code exactly as it appears below.  You will not need to use this code after youve completed the sign-up process. If you do not sign up before the expiration date, you must request a new code. · [employer] Access Code: [email or web address] Expires: 4/2/2018 11:19 AM 
 
4. Enter the last four digits of your Social Security Number (xxxx) and Date of Birth (mm/dd/yyyy) as indicated and click Submit. You will be taken to the next sign-up page. 5. Create a [employer] ID. This will be your [employer] login ID and cannot be changed, so think of one that is secure and easy to remember. 6. Create a [employer] password. You can change your password at any time. 7. Enter your Password Reset Question and Answer. This can be used at a later time if you forget your password. 8. Enter your e-mail address. You will receive e-mail notification when new information is available in [address] 9. Click Sign Up. You can now view and download portions of your medical record. 10. Click the Download Summary menu link to download a portable copy of your medical information. If you have questions, please visit the Frequently Asked Questions section of the [employer] website. Remember, [employer] is NOT to be used for urgent needs. For medical emergencies, dial 911. Now available from your iPhone and Android! Please provide this summary of care documentation to your next provider. Your primary care clinician is listed as Kassy Maciel. If you have any questions after today's visit, please call [phone].

## 2018-01-30 NOTE — PROGRESS NOTES
Chief Complaint   Patient presents with   [address] Follow-up     Patient in office today for f/u on labs and to have nuswab repeated. Pt has completed all abx therapy. 1. Have you been to the ER, urgent care clinic since your last visit? Hospitalized since your last visit? No    2. Have you seen or consulted any other health care providers outside of the [address] [place] [address] since your last visit? Include any pap smears or colon screening.  No

## 2018-02-03 LAB
A VAGINAE DNA VAG QL NAA+PROBE: NORMAL SCORE
BVAB2 DNA VAG QL NAA+PROBE: NORMAL SCORE
C ALBICANS DNA VAG QL NAA+PROBE: NEGATIVE
C GLABRATA DNA VAG QL NAA+PROBE: NEGATIVE
C TRACH RRNA SPEC QL NAA+PROBE: NEGATIVE
MEGA1 DNA VAG QL NAA+PROBE: NORMAL SCORE
N GONORRHOEA RRNA SPEC QL NAA+PROBE: NEGATIVE
T VAGINALIS RRNA SPEC QL NAA+PROBE: NEGATIVE

## 2018-02-05 ENCOUNTER — TELEPHONE (OUTPATIENT)
Dept: FAMILY MEDICINE CLINIC | Age: 23
End: 2018-02-05

## 2018-02-05 NOTE — PROGRESS NOTES
Please notify pt that her vaginal swab confirms that BV, yeast infection, and Chlamydia have resolved with completion of medications.

## 2022-08-26 NOTE — PROGRESS NOTES
Chief Complaint   Patient presents with    Urinary Frequency    Urinary Burning     Patient in office today for urinary sx that have not improved since lov. Pt urine culture was not positive for bacteria growth, abx was stopped. Pt states while on abx did not notice an improvement on abx. 1. Have you been to the ER, urgent care clinic since your last visit? Hospitalized since your last visit? No    2. Have you seen or consulted any other health care providers outside of the [address] [place] [address] since your last visit? Include any pap smears or colon screening.  No 80